# Patient Record
Sex: FEMALE | Race: WHITE | NOT HISPANIC OR LATINO | Employment: OTHER | URBAN - METROPOLITAN AREA
[De-identification: names, ages, dates, MRNs, and addresses within clinical notes are randomized per-mention and may not be internally consistent; named-entity substitution may affect disease eponyms.]

---

## 2021-01-01 ENCOUNTER — APPOINTMENT (OUTPATIENT)
Dept: GENERAL RADIOLOGY | Facility: CLINIC | Age: 86
End: 2021-01-01
Attending: EMERGENCY MEDICINE
Payer: COMMERCIAL

## 2021-01-01 ENCOUNTER — PATIENT OUTREACH (OUTPATIENT)
Dept: CARE COORDINATION | Facility: CLINIC | Age: 86
End: 2021-01-01

## 2021-01-01 ENCOUNTER — APPOINTMENT (OUTPATIENT)
Dept: CT IMAGING | Facility: CLINIC | Age: 86
End: 2021-01-01
Attending: HOSPITALIST
Payer: COMMERCIAL

## 2021-01-01 ENCOUNTER — APPOINTMENT (OUTPATIENT)
Dept: CT IMAGING | Facility: CLINIC | Age: 86
End: 2021-01-01
Attending: EMERGENCY MEDICINE
Payer: COMMERCIAL

## 2021-01-01 ENCOUNTER — HOSPITAL ENCOUNTER (OUTPATIENT)
Facility: CLINIC | Age: 86
Setting detail: OBSERVATION
Discharge: HOME | End: 2021-11-03
Attending: EMERGENCY MEDICINE | Admitting: INTERNAL MEDICINE
Payer: COMMERCIAL

## 2021-01-01 VITALS
OXYGEN SATURATION: 94 % | HEART RATE: 87 BPM | TEMPERATURE: 97.9 F | DIASTOLIC BLOOD PRESSURE: 76 MMHG | SYSTOLIC BLOOD PRESSURE: 149 MMHG | RESPIRATION RATE: 28 BRPM

## 2021-01-01 DIAGNOSIS — U07.1 COVID-19: ICD-10-CM

## 2021-01-01 DIAGNOSIS — Z71.89 OTHER SPECIFIED COUNSELING: ICD-10-CM

## 2021-01-01 DIAGNOSIS — F03.90 DEMENTIA WITHOUT BEHAVIORAL DISTURBANCE, UNSPECIFIED DEMENTIA TYPE: ICD-10-CM

## 2021-01-01 DIAGNOSIS — R41.82 ALTERED MENTAL STATUS, UNSPECIFIED ALTERED MENTAL STATUS TYPE: ICD-10-CM

## 2021-01-01 LAB
ANION GAP SERPL CALCULATED.3IONS-SCNC: 7 MMOL/L (ref 3–14)
BASOPHILS # BLD AUTO: 0 10E3/UL (ref 0–0.2)
BASOPHILS NFR BLD AUTO: 0 %
BUN SERPL-MCNC: 28 MG/DL (ref 7–30)
CALCIUM SERPL-MCNC: 8.3 MG/DL (ref 8.5–10.1)
CHLORIDE BLD-SCNC: 100 MMOL/L (ref 94–109)
CO2 SERPL-SCNC: 25 MMOL/L (ref 20–32)
CREAT SERPL-MCNC: 0.81 MG/DL (ref 0.52–1.04)
CRP SERPL-MCNC: 17.4 MG/L (ref 0–8)
D DIMER PPP FEU-MCNC: 1.85 UG/ML FEU (ref 0–0.5)
DEPRECATED S PYO AG THROAT QL EIA: NEGATIVE
EOSINOPHIL # BLD AUTO: 0 10E3/UL (ref 0–0.7)
EOSINOPHIL NFR BLD AUTO: 0 %
ERYTHROCYTE [DISTWIDTH] IN BLOOD BY AUTOMATED COUNT: 13.6 % (ref 10–15)
FLUAV RNA SPEC QL NAA+PROBE: NEGATIVE
FLUBV RNA RESP QL NAA+PROBE: NEGATIVE
GFR SERPL CREATININE-BSD FRML MDRD: 64 ML/MIN/1.73M2
GLUCOSE BLD-MCNC: 107 MG/DL (ref 70–99)
GROUP A STREP BY PCR: NOT DETECTED
HCT VFR BLD AUTO: 37.2 % (ref 35–47)
HGB BLD-MCNC: 12.4 G/DL (ref 11.7–15.7)
HOLD SPECIMEN: NORMAL
IMM GRANULOCYTES # BLD: 0 10E3/UL
IMM GRANULOCYTES NFR BLD: 0 %
LYMPHOCYTES # BLD AUTO: 1.3 10E3/UL (ref 0.8–5.3)
LYMPHOCYTES NFR BLD AUTO: 18 %
MAGNESIUM SERPL-MCNC: 2.2 MG/DL (ref 1.6–2.3)
MCH RBC QN AUTO: 33.2 PG (ref 26.5–33)
MCHC RBC AUTO-ENTMCNC: 33.3 G/DL (ref 31.5–36.5)
MCV RBC AUTO: 100 FL (ref 78–100)
MONOCYTES # BLD AUTO: 1 10E3/UL (ref 0–1.3)
MONOCYTES NFR BLD AUTO: 13 %
NEUTROPHILS # BLD AUTO: 4.9 10E3/UL (ref 1.6–8.3)
NEUTROPHILS NFR BLD AUTO: 69 %
NRBC # BLD AUTO: 0 10E3/UL
NRBC BLD AUTO-RTO: 0 /100
PLATELET # BLD AUTO: 218 10E3/UL (ref 150–450)
POTASSIUM BLD-SCNC: 3.9 MMOL/L (ref 3.4–5.3)
PROCALCITONIN SERPL-MCNC: <0.05 NG/ML
RBC # BLD AUTO: 3.74 10E6/UL (ref 3.8–5.2)
SARS-COV-2 RNA RESP QL NAA+PROBE: POSITIVE
SODIUM SERPL-SCNC: 132 MMOL/L (ref 133–144)
WBC # BLD AUTO: 7.2 10E3/UL (ref 4–11)

## 2021-01-01 PROCEDURE — 85025 COMPLETE CBC W/AUTO DIFF WBC: CPT | Performed by: EMERGENCY MEDICINE

## 2021-01-01 PROCEDURE — 71046 X-RAY EXAM CHEST 2 VIEWS: CPT

## 2021-01-01 PROCEDURE — 96374 THER/PROPH/DIAG INJ IV PUSH: CPT

## 2021-01-01 PROCEDURE — 87636 SARSCOV2 & INF A&B AMP PRB: CPT | Performed by: EMERGENCY MEDICINE

## 2021-01-01 PROCEDURE — C9803 HOPD COVID-19 SPEC COLLECT: HCPCS

## 2021-01-01 PROCEDURE — 83735 ASSAY OF MAGNESIUM: CPT | Performed by: EMERGENCY MEDICINE

## 2021-01-01 PROCEDURE — 84145 PROCALCITONIN (PCT): CPT | Performed by: EMERGENCY MEDICINE

## 2021-01-01 PROCEDURE — G0378 HOSPITAL OBSERVATION PER HR: HCPCS

## 2021-01-01 PROCEDURE — 99207 PR CDG-CODE CATEGORY CHANGED: CPT | Performed by: HOSPITALIST

## 2021-01-01 PROCEDURE — 85379 FIBRIN DEGRADATION QUANT: CPT | Performed by: EMERGENCY MEDICINE

## 2021-01-01 PROCEDURE — 250N000009 HC RX 250: Performed by: HOSPITALIST

## 2021-01-01 PROCEDURE — 99285 EMERGENCY DEPT VISIT HI MDM: CPT | Mod: 25

## 2021-01-01 PROCEDURE — 96372 THER/PROPH/DIAG INJ SC/IM: CPT | Performed by: EMERGENCY MEDICINE

## 2021-01-01 PROCEDURE — 96361 HYDRATE IV INFUSION ADD-ON: CPT

## 2021-01-01 PROCEDURE — 80048 BASIC METABOLIC PNL TOTAL CA: CPT | Performed by: EMERGENCY MEDICINE

## 2021-01-01 PROCEDURE — 258N000003 HC RX IP 258 OP 636: Performed by: EMERGENCY MEDICINE

## 2021-01-01 PROCEDURE — 86140 C-REACTIVE PROTEIN: CPT | Performed by: EMERGENCY MEDICINE

## 2021-01-01 PROCEDURE — 250N000013 HC RX MED GY IP 250 OP 250 PS 637: Performed by: INTERNAL MEDICINE

## 2021-01-01 PROCEDURE — 250N000013 HC RX MED GY IP 250 OP 250 PS 637: Performed by: HOSPITALIST

## 2021-01-01 PROCEDURE — 71275 CT ANGIOGRAPHY CHEST: CPT

## 2021-01-01 PROCEDURE — 36415 COLL VENOUS BLD VENIPUNCTURE: CPT | Performed by: EMERGENCY MEDICINE

## 2021-01-01 PROCEDURE — 99207 PR APP CREDIT; MD BILLING SHARED VISIT: CPT | Performed by: INTERNAL MEDICINE

## 2021-01-01 PROCEDURE — 99236 HOSP IP/OBS SAME DATE HI 85: CPT | Performed by: HOSPITALIST

## 2021-01-01 PROCEDURE — 250N000011 HC RX IP 250 OP 636: Performed by: HOSPITALIST

## 2021-01-01 PROCEDURE — 250N000011 HC RX IP 250 OP 636: Performed by: EMERGENCY MEDICINE

## 2021-01-01 PROCEDURE — 70450 CT HEAD/BRAIN W/O DYE: CPT

## 2021-01-01 PROCEDURE — 87651 STREP A DNA AMP PROBE: CPT | Performed by: EMERGENCY MEDICINE

## 2021-01-01 RX ORDER — CARBOXYMETHYLCELLULOSE SODIUM 5 MG/ML
1 SOLUTION/ DROPS OPHTHALMIC 2 TIMES DAILY
Qty: 30 EACH | Refills: 0 | Status: SHIPPED | OUTPATIENT
Start: 2021-01-01 | End: 2021-01-01

## 2021-01-01 RX ORDER — CARBOXYMETHYLCELLULOSE SODIUM 5 MG/ML
1 SOLUTION/ DROPS OPHTHALMIC 2 TIMES DAILY
Qty: 30 EACH | Refills: 0 | Status: SHIPPED | OUTPATIENT
Start: 2021-01-01

## 2021-01-01 RX ORDER — GLIPIZIDE 10 MG/1
1 TABLET ORAL 2 TIMES DAILY
Status: DISCONTINUED | OUTPATIENT
Start: 2021-01-01 | End: 2021-01-01 | Stop reason: HOSPADM

## 2021-01-01 RX ORDER — IOPAMIDOL 755 MG/ML
500 INJECTION, SOLUTION INTRAVASCULAR ONCE
Status: DISCONTINUED | OUTPATIENT
Start: 2021-01-01 | End: 2021-01-01

## 2021-01-01 RX ORDER — QUETIAPINE FUMARATE 25 MG/1
12.5 TABLET, FILM COATED ORAL 3 TIMES DAILY
Qty: 45 TABLET | Refills: 0 | Status: SHIPPED | OUTPATIENT
Start: 2021-01-01 | End: 2021-01-01

## 2021-01-01 RX ORDER — ACETAMINOPHEN 650 MG/1
650 SUPPOSITORY RECTAL EVERY 6 HOURS PRN
Status: DISCONTINUED | OUTPATIENT
Start: 2021-01-01 | End: 2021-01-01 | Stop reason: HOSPADM

## 2021-01-01 RX ORDER — AMOXICILLIN 250 MG
2 CAPSULE ORAL 2 TIMES DAILY PRN
Status: DISCONTINUED | OUTPATIENT
Start: 2021-01-01 | End: 2021-01-01 | Stop reason: HOSPADM

## 2021-01-01 RX ORDER — QUETIAPINE FUMARATE 25 MG/1
12.5 TABLET, FILM COATED ORAL 3 TIMES DAILY
Qty: 45 TABLET | Refills: 0 | Status: SHIPPED | OUTPATIENT
Start: 2021-01-01

## 2021-01-01 RX ORDER — ONDANSETRON 2 MG/ML
4 INJECTION INTRAMUSCULAR; INTRAVENOUS EVERY 6 HOURS PRN
Status: DISCONTINUED | OUTPATIENT
Start: 2021-01-01 | End: 2021-01-01 | Stop reason: HOSPADM

## 2021-01-01 RX ORDER — CARBOXYMETHYLCELLULOSE SODIUM 5 MG/ML
1 SOLUTION/ DROPS OPHTHALMIC 2 TIMES DAILY
COMMUNITY
End: 2021-01-01

## 2021-01-01 RX ORDER — FUROSEMIDE 20 MG
10 TABLET ORAL EVERY MORNING
COMMUNITY
End: 2021-01-01

## 2021-01-01 RX ORDER — ACETAMINOPHEN 325 MG/1
325 TABLET ORAL 2 TIMES DAILY
Qty: 60 TABLET | Refills: 0 | Status: SHIPPED | OUTPATIENT
Start: 2021-01-01 | End: 2021-01-01

## 2021-01-01 RX ORDER — HALOPERIDOL 5 MG/ML
5 INJECTION INTRAMUSCULAR ONCE
Status: COMPLETED | OUTPATIENT
Start: 2021-01-01 | End: 2021-01-01

## 2021-01-01 RX ORDER — OMEPRAZOLE 10 MG/1
20 CAPSULE, DELAYED RELEASE ORAL
Qty: 60 CAPSULE | Refills: 0 | Status: SHIPPED | OUTPATIENT
Start: 2021-01-01 | End: 2021-01-01

## 2021-01-01 RX ORDER — ACETAMINOPHEN 325 MG/1
325 TABLET ORAL 2 TIMES DAILY
COMMUNITY
End: 2021-01-01

## 2021-01-01 RX ORDER — BISACODYL 10 MG
10 SUPPOSITORY, RECTAL RECTAL DAILY PRN
Status: DISCONTINUED | OUTPATIENT
Start: 2021-01-01 | End: 2021-01-01 | Stop reason: HOSPADM

## 2021-01-01 RX ORDER — POLYETHYLENE GLYCOL 3350 17 G/17G
17 POWDER, FOR SOLUTION ORAL DAILY PRN
Status: DISCONTINUED | OUTPATIENT
Start: 2021-01-01 | End: 2021-01-01 | Stop reason: HOSPADM

## 2021-01-01 RX ORDER — ONDANSETRON 4 MG/1
4 TABLET, ORALLY DISINTEGRATING ORAL EVERY 6 HOURS PRN
Status: DISCONTINUED | OUTPATIENT
Start: 2021-01-01 | End: 2021-01-01 | Stop reason: HOSPADM

## 2021-01-01 RX ORDER — QUETIAPINE FUMARATE 25 MG/1
12.5 TABLET, FILM COATED ORAL 3 TIMES DAILY
COMMUNITY
End: 2021-01-01

## 2021-01-01 RX ORDER — MIRTAZAPINE 7.5 MG/1
7.5 TABLET, FILM COATED ORAL AT BEDTIME
COMMUNITY
End: 2021-01-01

## 2021-01-01 RX ORDER — OMEPRAZOLE 10 MG/1
20 CAPSULE, DELAYED RELEASE ORAL
Qty: 60 CAPSULE | Refills: 0 | Status: SHIPPED | OUTPATIENT
Start: 2021-01-01

## 2021-01-01 RX ORDER — ACETAMINOPHEN 325 MG/1
650 TABLET ORAL EVERY 6 HOURS PRN
Status: DISCONTINUED | OUTPATIENT
Start: 2021-01-01 | End: 2021-01-01 | Stop reason: HOSPADM

## 2021-01-01 RX ORDER — MIRTAZAPINE 7.5 MG/1
7.5 TABLET, FILM COATED ORAL AT BEDTIME
Qty: 30 TABLET | Refills: 0 | Status: SHIPPED | OUTPATIENT
Start: 2021-01-01 | End: 2021-01-01

## 2021-01-01 RX ORDER — AMOXICILLIN 250 MG
1 CAPSULE ORAL 2 TIMES DAILY PRN
Status: DISCONTINUED | OUTPATIENT
Start: 2021-01-01 | End: 2021-01-01 | Stop reason: HOSPADM

## 2021-01-01 RX ORDER — MIRTAZAPINE 7.5 MG/1
7.5 TABLET, FILM COATED ORAL AT BEDTIME
Qty: 30 TABLET | Refills: 0 | Status: SHIPPED | OUTPATIENT
Start: 2021-01-01

## 2021-01-01 RX ORDER — OMEPRAZOLE 10 MG/1
20 CAPSULE, DELAYED RELEASE ORAL
COMMUNITY
End: 2021-01-01

## 2021-01-01 RX ORDER — QUETIAPINE FUMARATE 25 MG/1
12.5 TABLET, FILM COATED ORAL
COMMUNITY
End: 2021-01-01

## 2021-01-01 RX ORDER — MIRTAZAPINE 7.5 MG/1
7.5 TABLET, FILM COATED ORAL AT BEDTIME
Status: DISCONTINUED | OUTPATIENT
Start: 2021-01-01 | End: 2021-01-01 | Stop reason: HOSPADM

## 2021-01-01 RX ORDER — ACETAMINOPHEN 325 MG/1
325 TABLET ORAL EVERY 8 HOURS PRN
COMMUNITY
End: 2021-01-01

## 2021-01-01 RX ORDER — FUROSEMIDE 20 MG
10 TABLET ORAL EVERY MORNING
Qty: 15 TABLET | Refills: 0 | Status: SHIPPED | OUTPATIENT
Start: 2021-01-01 | End: 2021-01-01

## 2021-01-01 RX ORDER — IOPAMIDOL 755 MG/ML
500 INJECTION, SOLUTION INTRAVASCULAR ONCE
Status: COMPLETED | OUTPATIENT
Start: 2021-01-01 | End: 2021-01-01

## 2021-01-01 RX ORDER — QUETIAPINE FUMARATE 25 MG/1
12.5 TABLET, FILM COATED ORAL
Qty: 15 TABLET | Refills: 0 | Status: SHIPPED | OUTPATIENT
Start: 2021-01-01 | End: 2021-01-01

## 2021-01-01 RX ORDER — ACETAMINOPHEN 325 MG/1
325 TABLET ORAL EVERY 8 HOURS PRN
Qty: 30 TABLET | Refills: 0 | Status: SHIPPED | OUTPATIENT
Start: 2021-01-01

## 2021-01-01 RX ORDER — ACETAMINOPHEN 325 MG/1
325 TABLET ORAL EVERY 8 HOURS PRN
Qty: 30 TABLET | Refills: 0 | Status: SHIPPED | OUTPATIENT
Start: 2021-01-01 | End: 2021-01-01

## 2021-01-01 RX ORDER — FUROSEMIDE 20 MG
10 TABLET ORAL EVERY MORNING
Qty: 15 TABLET | Refills: 0 | Status: SHIPPED | OUTPATIENT
Start: 2021-01-01

## 2021-01-01 RX ORDER — OLANZAPINE 10 MG/2ML
10 INJECTION, POWDER, FOR SOLUTION INTRAMUSCULAR ONCE
Status: COMPLETED | OUTPATIENT
Start: 2021-01-01 | End: 2021-01-01

## 2021-01-01 RX ORDER — QUETIAPINE FUMARATE 25 MG/1
12.5 TABLET, FILM COATED ORAL
Qty: 15 TABLET | Refills: 0 | DISCHARGE
Start: 2021-01-01

## 2021-01-01 RX ADMIN — SODIUM CHLORIDE 82 ML: 9 INJECTION, SOLUTION INTRAVENOUS at 11:50

## 2021-01-01 RX ADMIN — IOPAMIDOL 59 ML: 755 INJECTION, SOLUTION INTRAVENOUS at 11:49

## 2021-01-01 RX ADMIN — OLANZAPINE 10 MG: 10 INJECTION, POWDER, FOR SOLUTION INTRAMUSCULAR at 02:32

## 2021-01-01 RX ADMIN — QUETIAPINE FUMARATE 12.5 MG: 25 TABLET ORAL at 15:52

## 2021-01-01 RX ADMIN — HALOPERIDOL LACTATE 5 MG: 5 INJECTION, SOLUTION INTRAMUSCULAR at 01:47

## 2021-01-01 RX ADMIN — SODIUM CHLORIDE 1000 ML: 9 INJECTION, SOLUTION INTRAVENOUS at 02:31

## 2021-01-01 RX ADMIN — ACETAMINOPHEN 650 MG: 325 TABLET, FILM COATED ORAL at 05:11

## 2021-01-01 ASSESSMENT — ACTIVITIES OF DAILY LIVING (ADL)
DEPENDENT_IADLS:: CLEANING;COOKING;LAUNDRY;SHOPPING;MEAL PREPARATION;MEDICATION MANAGEMENT;MONEY MANAGEMENT;TRANSPORTATION

## 2021-11-03 PROBLEM — U07.1 COVID-19: Status: ACTIVE | Noted: 2021-01-01

## 2021-11-03 PROBLEM — R41.82 ALTERED MENTAL STATUS, UNSPECIFIED ALTERED MENTAL STATUS TYPE: Status: ACTIVE | Noted: 2021-01-01

## 2021-11-03 NOTE — ED NOTES
Daughter brought her in from assisted living has a deep cough , some yellow. Pt has double hearing aids. [facility] checked for uti neg at 2000. Hs of Alzheimer

## 2021-11-03 NOTE — PROGRESS NOTES
" called Joceline the nurse at the patient's home noting that the patient is ready for discharge. Joceline noted that the patient cannot come back to her assisted living facility tonight and needs to get the rest of the people tested for COVID-19. The  noted that she initially thought the patient would be here 1-2 days and noted the patient is actually ready for discharge. Joceline said \"you cannot keep her until 8 am tomorrow?\" The  noted that the patient is ready for discharge. The  noted that it is the law in [place] that assisted living facilities have to take their client's back. Joceline hung up on the .  called Joceline back. Joceline said she is putting in her notice to quit. She was going to call the owner of the home, Pastor Mauricio. She then hung up on the  again.      called the owner, Pastor Mauricio. The  explained that the patient is ready for discharge. Hang expressed concern that there are other 90 year olds and a 105 year old in the home and the patient will spread COVID to the other residents and they will die. The  asked what precautions they have taken thus far to keep residents safe. Hang explained that the patient is not going to be able to stay in her own room unless the hospital gives her drugs to keep her in there. Hang noted that the hospital has to keep the patient a few more days. The  explained that the patient does not need medical admission at this time and is ready to discharge. The  noted that we would not kick the patient to the street but she is ready for discharge. Hang was going to call Joceline and then call the nursing station back.       CAMPBELL Tirado, [facility]  , ED Care Management   [phone]      "

## 2021-11-03 NOTE — CONSULTS
See ED note for SW consult.     CAMPBELL Tirado, [facility]  , ED Care Management   [phone]

## 2021-11-03 NOTE — ED NOTES
Care Management Initial Consult    General Information  Assessment completed with: Children, Patient's daughter Carlene   Type of CM/SW Visit: Initial Assessment    Primary Care Provider verified and updated as needed:     Readmission within the last 30 days: no previous admission in last 30 days      Reason for Consult: discharge planning  Advance Care Planning:            Communication Assessment  Patient's communication style: spoken language (English or Bilingual)    Hearing Difficulty or Deaf: yes   Wear Glasses or Blind: yes    Cognitive  Cognitive/Neuro/Behavioral:                        Living Environment:   People in home: facility resident     Current living Arrangements: assisted living  Name of Facility: [facility] in [place]   Able to return to prior arrangements: yes       Family/Social Support:  Care provided by: self, other (see comments) (Facility staff)  Provides care for: no one, unable/limited ability to care for self     Children          Description of Support System: Supportive, Involved    Support Assessment: Adequate family and caregiver support    Current Resources:   Patient receiving home care services: No     Community Resources:    Equipment currently used at home: walker, standard  Supplies currently used at home:      Employment/Financial:  Employment Status:          Financial Concerns: No concerns identified   Referral to Financial Counselor: No  Finance Comments: Elvira Concepcion is POA    Lifestyle & Psychosocial Needs:  Social Determinants of Health     Tobacco Use: Unknown     Smoking Tobacco Use: Never Smoker     Smokeless Tobacco Use: Unknown   Alcohol Use:      Frequency of Alcohol Consumption:      Average Number of Drinks:      Frequency of Binge Drinking:    Financial Resource Strain:      Difficulty of Paying Living Expenses:    Food Insecurity:      Worried About Running Out of Food in the Last Year:      Ran Out of Food in the Last Year:    Transportation Needs:      Lack of  Transportation (Medical):      Lack of Transportation (Non-Medical):    Physical Activity:      Days of Exercise per Week:      Minutes of Exercise per Session:    Stress:      Feeling of Stress :    Social Connections:      Frequency of Communication with Friends and Family:      Frequency of Social Gatherings with Friends and Family:      Attends Scientology Services:      Active Member of Clubs or Organizations:      Attends Club or Organization Meetings:      Marital Status:    Intimate Partner Violence:      Fear of Current or Ex-Partner:      Emotionally Abused:      Physically Abused:      Sexually Abused:    Depression:      PHQ-2 Score:    Housing Stability:      Unable to Pay for Housing in the Last Year:      Number of Places Lived in the Last Year:      Unstable Housing in the Last Year:        Functional Status:  Prior to admission patient needed assistance:   Dependent ADLs:: Ambulation-walker, Bathing  Dependent IADLs:: Cleaning, Cooking, Laundry, Shopping, Meal Preparation, Medication Management, Money Management, Transportation  Assesssment of Functional Status: Not at baseline with mobility, Has complex medical needs, requires placement in a facility, Needs assistance with handling finances, Needs assistance with laundry/houskeeping, Needs assistance with meals, Needs assistance with bathing, Needs assistance with medications, Needs assistance with shopping, Needs assistance with transportation, Not at baseline with ADL Functioning    Mental Health Status:          Chemical Dependency Status:                Values/Beliefs:  Spiritual, Cultural Beliefs, Scientology Practices, Values that affect care:                 Additional Information:  Patient presents to the ED from [facility] which is an assisted living memory care unit in [place]. The address on file is the patient's daughter's address which the patient's daughter would like to keep as the one on file. The RN at [facility] is Eunice Hay  ([phone]). The owner is Pastor Hang Iyer ([phone]). The main line at the home is [phone]. Patient's daughter gave permission for SW to call the home to coordinate care.      called the main number to the home and was directed to call Joceline, a nurse. Her number is [phone]. Joceline reports the patient  has behaviors specific to bathrooms. Patient is on a strict bathroom schedule of every 2 hours. 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 Joceline's email is  [email or web address] . Joceline reports that the patient can discharge back to her home. They would just need a med list.       The assessment was completed via phone with the patient's daughter due to COVID   + result and patient having dementia. The patient's daughter Carlene is the POA. Carlene reports the patient has an advanced directive at her facility.  encouraged her to email it to [facility] [employer].     Patient gets assistance with medication administration, bathing, and meals. Patient uses a walker at baseline. Patient's family is being tested for COVID. They will discuss transportation.       CAMPBELL Tirado, [facility]  , ED Care Management   [phone]

## 2021-11-03 NOTE — DISCHARGE SUMMARY
[facility]  Hospitalist Discharge Summary      Date of Admission:  11/2/2021  Date of Discharge:  11/3/2021  Discharging Provider: Sohail Esteban DO      Discharge Diagnoses   1.  Acute encephalopathy.  2.  Advanced Alzheimer's dementia.  3.  COVID-19 infection.  4.  Mild hyponatremia.    Follow-ups Needed After Discharge   Follow-up Appointments     Follow Up and recommended labs and tests      Follow up with primary care provider in 7 days.  The following labs/tests   are recommended: Basic metabolic panel in 7 days.             Discharge Disposition   Discharged to long-term care facility  Condition at discharge: Stable      Hospital Course   Desiree Andino is a 89 year old female with a history of Alzheimer's dementia, anxiety, insomnia, GERD, peripheral vascular disease, breast cancer hyperlipidemia who presents with worsening confusion and agitation.  Laboratory testing did show very mild hyponatremia with a sodium of 132.  She is also found to have a positive SARS-CoV-2 PCR.  She was not hypoxic.  Did not need supplemental oxygen.  Her very mild acute encephalopathy on her already quite advanced dementia is likely due to her COVID-19 infection.  She is not requiring specific medications or supplemental oxygen.  She is previously vaccinated.  She could be considered for monoclonal antibody therapy in the outpatient setting by her primary care provider if there is concern that she would worsen.  She should follow-up with her primary care provider within the next 7 days.  Needs to have a basic metabolic panel done at that time.  She is being discharged back to her memory care unit at this time.  Mental status does seem improved and likely back to baseline.  No changes to medications were made during this hospital stay.    Consultations This Hospital Stay   CARE MANAGEMENT / SOCIAL WORK IP CONSULT  SOCIAL WORK IP CONSULT    Code Status   No CPR- Do NOT Intubate    Time Spent on  this Encounter   I spent 20 minutes with Ms. Andino and working on discharge on 11/3/2021.       Sohail Esteban [facility] EMERGENCY DEPT  [address] [facility] [address]  Phone: [phone]  Fax: [phone]  ______________________________________________________________________    Physical Exam   Vital Signs: Temp: 97.9  F (36.6  C) Temp src: Oral BP: (!) 149/76 Pulse: 87   Resp: 28 SpO2: 98 % O2 Device: None (Room air)    Weight: 0 lbs 0 oz  Gen:  NAD, A&Ox1 to self.  Not oriented to place or time.           Primary Care Physician   Christa Vaca    Discharge Orders      General info for SNF    Length of Stay Estimate: Long Term Care  Condition at Discharge: Stable  Level of care:skilled   Rehabilitation Potential: Poor  Admission H&P remains valid and up-to-date: Yes  Recent Chemotherapy: N/A  Use Nursing Home Standing Orders: Yes     Mantoux instructions    Give two-step Mantoux (PPD) Per Facility Policy Yes     Follow Up and recommended labs and tests    Follow up with primary care provider in 7 days.  The following labs/tests are recommended: Basic metabolic panel in 7 days.     Reason for your hospital stay    Increased confusion on chronic dementia     Activity - Up with nursing assistance     Diet    Follow this diet upon discharge: Regular           Discharge Medications   Current Discharge Medication List      CONTINUE these medications which have NOT CHANGED    Details   !! acetaminophen (TYLENOL) 325 MG tablet Take 325 mg by mouth every 8 hours as needed for mild pain      !! acetaminophen (TYLENOL) 325 MG tablet Take 325 mg by mouth 2 times daily 0800/2000      carboxymethylcellulose PF (REFRESH PLUS) 0.5 % ophthalmic solution Place 1 drop into both eyes 2 times daily      furosemide (LASIX) 20 MG tablet Take 10 mg by mouth every morning      mirtazapine (REMERON) 7.5 MG tablet Take 7.5 mg by mouth At Bedtime      omeprazole (PRILOSEC) 10 MG DR capsule Take 20  mg by mouth every morning (before breakfast)      phenol-menthol (CEPASTAT) 14.5 MG lozenge Place 1 lozenge inside cheek every 2 hours as needed for sore throat      !! QUEtiapine (SEROQUEL) 25 MG tablet Take 12.5 mg by mouth 3 times daily 0800/1600/2000      !! QUEtiapine (SEROQUEL) 25 MG tablet Take 12.5 mg by mouth nightly as needed       !! - Potential duplicate medications found. Please discuss with provider.        Allergies   Allergies   Allergen Reactions     Nitrofurantoin Unknown

## 2021-11-03 NOTE — ED PROVIDER NOTES
History   Chief Complaint:  Altered Mental Status     HPI The history is limited due to the patient's dementia.      Desiree Andino is a 89 year old female with history of dementia who presents accompanied by her daughter for evaluation of an altered mental status. Recently the patient has developed a somewhat productive cough with yellow sputum, a sore throat, congestion, and some rhinorrhea. Yesterday afternoon staff at the patient's memory care facility noticed that she appeared more confused than usual. She was seen in an urgent care earlier today regarding this at which time she reportedly had an unremarkable urinalysis and she was sent into the ED for further evaluation and management. She denies any chest pain, difficulty breathing, or abdominal pain. She is vaccinated for COVID-19.     Urinalysis: RBC 6-10, WBC 3-5, Few bacteria, Few epithelial, Mucus present, Hyaline casts 0-2, o/w WNL     Review of Systems   Unable to perform ROS: Dementia     Allergies:  Nitrofurantoin     Medications:  The patient is not currently taking any prescribed medications.     Past Medical History:     Dementia   Breast cancer   Atrial fibrillation  Cancer  Nonsenile cataract       Past Surgical History:    Appendectomy  Biopsy  Cholecystectomy  Eye surgery  Gyn surgery  Hernia repair      Social History:  The patient presents to the ED accompanied by her daughter.   The patient lives in a memory care facility.   The patient is vaccinated for COVID-19.     Physical Exam     Patient Vitals for the past 24 hrs:   BP Temp Temp src Pulse Resp SpO2   11/03/21 0145 121/71 -- -- 83 -- 98 %   11/03/21 0130 134/83 -- -- 81 -- 98 %   11/02/21 2113 106/64 98.6  F (37  C) Oral 92 16 98 %       Physical Exam  General: Patient is awake, alert and interactive when I enter the room  Head: The scalp, face, and head appear normal  Eyes: The pupils are equal, round, and reactive to light. Conjunctivae and sclerae are normal  ENT: External  acoustic canals are normal. The oropharynx is normal without erythema. Uvula is in the midline  Neck: Normal range of motion.   CV: Regular rate and rhythm.   Resp: Lungs are clear without wheezes or rales. No respiratory distress.   GI: Abdomen is soft, no rigidity, guarding, or rebound. No distension. No tenderness to palpation in any quadrant.     MS: Normal tone. Joints grossly normal without effusions. No asymmetric leg swelling, calf or thigh tenderness.    Skin: No rash or lesions noted. Normal capillary refill noted  Neuro: dementia at baseline. Oriented to self. Speech is normal and fluent. Face is symmetric. Moving all extremities.   Psych:  Normal affect.  Appropriate interactions.    Emergency Department Course     Imaging:  XR Chest:  IMPRESSION: Mildly enlarged cardiac silhouette. Calcified aortic arch. Patchy areas of airspace opacification in the right lateral lung base could reflect an infectious infiltrate. No visible pneumothorax or pleural effusion. Right axillary surgical   clips. Osteopenia.  Per radiology.     CT Head w/o Contrast:  IMPRESSION:   1.  No acute intracranial abnormality.   2.  Mild to moderate age-related changes.  Per radiology.     Laboratory:  CBC: WBC 7.2, HGB 12.4,    BMP:  low, Calcium 8.3 low, Glucose 107 high, o/w WNL (Creatinine 0.81)   Magnesium: 2.2  D dimer quantitative: 1.85 high   CRP inflammation: 17.4 high   Symptomatic Influenza A/B & SARS-CoV2 (COVID19) Virus PCR Multiplex: COVID positive, Influenza A/B negative   Streptococcus A rapid screen with reflex to PCR: Negative  Group A Streptococcus PCR throat swab: Pending        Emergency Department Course:  Reviewed:  I reviewed nursing notes, vitals and past medical history    Assessments:  9613: I obtained history and examined the patient as noted above.     0129: I updated and reassessed the patient.     Consults:  0208: I spoke with Dr. Cannon of the hospitalist service regarding patient's  presentation, findings, and plan of care.     Interventions:  0147 Haldol 5 mg IV   0231 NS 1,000 mL IV   0232 Zyprexa 10 mg IM   0511 Tylenol 650 mg PO    Disposition:  The patient was admitted to the hospital under the care of Dr. Cannon.     Impression & Plan     Medical Decision Making:  Desiree Andino is a 89 year old female with history of dementia who presents accompanied by her daughter for evaluation of an altered mental status.  Daughter reports that the patient has had symptoms of productive cough, sore throat and congestion with increased confusion.  She is brought in from her memory care facility due to increased agitation.  On initial evaluation she is hemodynamically stable with normal vital signs.  She is afebrile.  She is oxygenating well on room air.  On initial physical exam she does not appear in any respiratory distress and is redirectable and alert.  However daughter reports that she is below her normal mental baseline.  Investigation was initiated to investigate the patient's altered mental status and respiratory complaints.  Patient was ultimately found to have COVID-19 infection likely leading to both her altered mental status and respiratory symptoms.  Patient remained stable on room air oxygen and would not require admission from a respiratory standpoint but due to her increasing confusion and agitation and I believe she is unsafe to return to her assisted care facility.  Therefore she will be admitted for further evaluation and treatment.  Discussed this plan with the patient's daughter and she is amenable at this time.  Haldol and Zyprexa were administered to facilitate our cares and for anxiolysis.     Covid-19  Desiree Andino was evaluated during a global COVID-19 pandemic, which necessitated consideration that the patient might be at risk for infection with the SARS-CoV-2 virus that causes COVID-19.   Applicable protocols for evaluation were followed during the patient's  care.   COVID-19 was considered as part of the patient's evaluation. The plan for testing is:  a test was obtained during this visit.     Diagnosis:    ICD-10-CM    1. Altered mental status, unspecified altered mental status type  R41.82    2. COVID-19  U07.1    3. Dementia without behavioral disturbance, unspecified dementia type (H)  F03.90         Scribe Disclosure:  I, Sudarshan Carrillo, am serving as a scribe at 11:55 PM on 11/2/2021 to document services personally performed by Humberto Miranda MD, based on my observations and the provider's statements to me.      Humberto Miranda MD  11/03/21 0607

## 2021-11-03 NOTE — ED NOTES
Updates pt's daughter on condition and plan for care.  Discussed CT performed and reorientation of dementia sx.  Daughter, Carlene, requests update if condition changes or when pt moves to new room.

## 2021-11-03 NOTE — ED NOTES
[facility]  ED Nurse Handoff Report    Desiree Andino is a 89 year old female   ED Chief complaint: Altered Mental Status  . ED Diagnosis:   Final diagnoses:   None     Allergies:   Allergies   Allergen Reactions     Nitrofurantoin Unknown       Code Status: {dnr  Activity level - Baseline/Home:  Assist X 1. Activity Level - Current:   Assist X 1. Lift room needed: No. Bariatric: No   Needed: No   Isolation: Yes. Infection: covid    Vital Signs:   Vitals:    11/02/21 2113 11/03/21 0130   BP: 106/64 134/83   Pulse: 92 81   Resp: 16    Temp: 98.6  F (37  C)    TempSrc: Oral    SpO2: 98% 98%       Cardiac Rhythm:  ,      Pain level:    Patient confused: Yes. Patient Falls Risk: Yes.   Elimination Status: Has voided   Patient Report - Initial Complaint: daughter brought her in from assisted living with a cough. Focused Assessment: occ cough. Pt very confused  Tests Performed:swab , labs. Abnormal Results: positive for covidTreatments provided: admit  Family Comments:daughter hereOBS brochure/video discussed/provided to patient: no  ED Medications: Medications - No data to display  Drips infusing:  No  For the majority of the shift, the patient's behavior Green. Interventions performed were .    Sepsis treatment initiated: no    Patient tested for COVID 19 prior to admission: positive    ED Nurse Name/Phone Number: Kristen Stevens RN,   1:35 AM  222

## 2021-11-03 NOTE — ED NOTES
Spoke to nurse at ROGELIO's home Joceline who stated a  had spoke to her and assured her it would be 1-2 days before he cam back and she was not able to taker her back tonight because none of the other residents have been tested and will not be until tomorrow.   had written a note that the home would take them back and keep her in her private room.  Turned over to .

## 2021-11-03 NOTE — ED NOTES
Ordered meal for pt and assisted pt with eating. Pt ate most of her meal tray. Writer assisted pt to the commode after she finished eating. Writer assisted by RN as pt is a two person assist. Pt currently resting in bed.

## 2021-11-03 NOTE — ED NOTES
Care Management Discharge Note    Discharge Date: 11/03/2021       Discharge Disposition:  Discharge back to assisted living     Discharge Services:  none    Discharge DME:  None     Discharge Transportation:  Family or TBD    Private pay costs discussed: Not applicable      Additional Information:  Patient is discharging back to her facility. Facility is able to take patient back in her own private room.     CAMPBELL Tirado, [facility]  , ED Care Management   [phone]

## 2021-11-03 NOTE — ED NOTES
RN attempted to start another IV for CT PE. Patient became combative and hitting RN. MD Miranda notified. IV not placed.

## 2021-11-03 NOTE — ED TRIAGE NOTES
Per memory care staff pt has been more confused since yesterday afternoon.  staff say she has had a cough and nasal congestion.  No fever.  Pt had a urine done at urgent care which was negative.  Here for further work up.

## 2021-11-03 NOTE — PHARMACY-ADMISSION MEDICATION HISTORY
Admission medication history interview status for this patient is complete. See [facility] admission navigator for allergy information, prior to admission medications and immunization status.     Medication history interview done, indicate source(s): SANGEETHA Car from [facility] ([phone])  Medication history resources (including written lists, pill bottles, clinic record):None  [facility] [address]    Changes made to PTA medication list:  Added:  ALL meds  Changed: -  Reported as Not Taking: -  Removed: -    Actions taken by pharmacist (provider contacted, etc):None   Additional medication history information:None  Medication reconciliation/reorder completed by provider prior to medication history? no     Prior to Admission medications    Medication Sig Last Dose Taking? Auth Provider   acetaminophen (TYLENOL) 325 MG tablet Take 325 mg by mouth every 8 hours as needed for mild pain More than a month at Unknown time Yes Unknown, Entered By History   acetaminophen (TYLENOL) 325 MG tablet Take 325 mg by mouth 2 times daily 0800/2000 11/2/2021 at x 1 Yes Unknown, Entered By History   carboxymethylcellulose PF (REFRESH PLUS) 0.5 % ophthalmic solution Place 1 drop into both eyes 2 times daily 11/2/2021 at x 1 Yes Unknown, Entered By History   furosemide (LASIX) 20 MG tablet Take 10 mg by mouth every morning 11/2/2021 at am Yes Unknown, Entered By History   mirtazapine (REMERON) 7.5 MG tablet Take 7.5 mg by mouth At Bedtime 11/1/2021 at hs Yes Unknown, Entered By History   omeprazole (PRILOSEC) 10 MG DR capsule Take 20 mg by mouth every morning (before breakfast) 11/2/2021 at am Yes Unknown, Entered By History   phenol-menthol (CEPASTAT) 14.5 MG lozenge Place 1 lozenge inside cheek every 2 hours as needed for sore throat Past Month at Unknown time Yes Unknown, Entered By History   QUEtiapine (SEROQUEL) 25 MG tablet Take 12.5 mg by mouth 3 times daily 0800/1600/2000 11/2/2021  at x 1 Yes Unknown, Entered By History   QUEtiapine (SEROQUEL) 25 MG tablet Take 12.5 mg by mouth nightly as needed Past Month at Unknown time Yes Unknown, Entered By History

## 2021-11-03 NOTE — H&P
[facility]  Hospitalist H&P    Name: Desiree Andino      MRN: [id number]  YOB: 1930    Age: 89 year old  Date of admission: 11/2/2021  Primary care provider: Christa Vaca            Assessment and Plan:     Desiree Andino is a 89 year old female with a history of Alzheimer's dementia, anxiety, insomnia, GERD, peripheral vascular disease, breast cancer hyperlipidemia who presents with worsening confusion and agitation.    The patient lives at a memory care unit.  Over the past few days she has developed a productive cough with yellow sputum and a sore throat with congestion and rhinorrhea.  Yesterday afternoon the patient's staff at her facility noticed that she was more confused and agitated than her typical condition.  She was brought to urgent care for evaluation and urinalysis was performed that was unremarkable and as a result she was directed to the ED for evaluation.    Here her temperature is 98.6, heart rate 92, blood pressure 106/64, respiratory 16 and oxygen saturation 98% on room air.  Labs show sodium of 132 but otherwise normal basic metabolic panel.  Magnesium is 2.2 and CRP of 17.4.  CBC normal.  Rapid strep screen is negative and culture pending.  Influenza is negative but COVID-19 PCR is positive.  Head CT shows no abnormalities.  Chest x-ray shows patchy area of opacification in the right lateral lung which could reflect infectious infiltrate.  D-dimer is elevated at 1.8 and CT PE protocol is pending.  She was given Haldol and Zyprexa and will be admitted for observation.    1. Acute encephalopathy: Likely due to COVID-19 infection.  Currently calm and resting.  Received Haldol and Zyprexa in the ED.  She has been seeing her PCP recently for worsening OCD and anxiety.  I believe chronically she is on Remeron with as needed Seroquel.  We will continue antipsychotics as needed for agitation.  2. Advanced [place]'s dementia: Resume prior to admission  medications when reconciled.  At baseline appears living in memory care unit.  3. COVID-19 infection: She is fully vaccinated.  Currently not hypoxic nor febrile.  No indications for steroids or remdesivir.  I anticipate only a brief hospitalization so we will hold off on anticoagulation for the time being.  Maintain special isolation precautions.  4. Elevated D-dimer: CT PE protocol ordered by Dr. Miranda.  We will follow up results.  Chest x-ray does show some degree of infiltrates.  We will check a procalcitonin level but not initiate antibiotics.    Code status: DNR/DNI per chart review.  Admit to observation status.  Prophylaxis: PCD's for now, if not discharging later in the day today or tomorrow I would initiate enoxaparin.  Disposition: Likely back to memory care unit in 1 to 2 days.            Chief Complaint:     Worsening confusion.         History of Present Illness:   Desiree Andino is a 89 year old female who presents with worsening confusion.  History was obtained from my discussion with the ED provider.  The electronic medical record was also reviewed.    The patient lives at a memory care unit.  Over the past few days she has developed a productive cough with yellow sputum and a sore throat with congestion and rhinorrhea.  Yesterday afternoon the patient's staff at her facility noticed that she was more confused and agitated than her typical condition.  She was brought to urgent care for evaluation and urinalysis was performed that was unremarkable and as a result she was directed to the ED for evaluation.    Here her temperature is 98.6, heart rate 92, blood pressure 106/64, respiratory 16 and oxygen saturation 98% on room air.  Labs show sodium of 132 but otherwise normal basic metabolic panel.  Magnesium is 2.2 and CRP of 17.4.  CBC normal.  Rapid strep screen is negative and culture pending.  Influenza is negative but COVID-19 PCR is positive.  Head CT shows no abnormalities.  Chest x-ray  shows patchy area of opacification in the right lateral lung which could reflect infectious infiltrate.  D-dimer is elevated at 1.8 and CT PE protocol is pending.  She was given Haldol and Zyprexa and will be admitted for observation.            Past Medical History:     Past Medical History:   Diagnosis Date     A-fib (H)      Alzheimer disease (H)      Cancer (H)      Nonsenile cataract              Past Surgical History:     Past Surgical History:   Procedure Laterality Date     APPENDECTOMY       BIOPSY       CHOLECYSTECTOMY       EYE SURGERY       GYN SURGERY       HERNIA REPAIR               Social History:     Social History     Tobacco Use     Smoking status: Never Smoker   Substance Use Topics     Alcohol use: Never             Family History:   The family history was fully reviewed and non-contributory in this case.         Allergies:     Allergies   Allergen Reactions     Nitrofurantoin Unknown             Medications:     Prior to Admission medications    Not on File             Review of Systems:     A Comprehensive greater than 10 system review of systems was carried out.  Pertinent positives and negatives are noted above.  Otherwise negative for contributory information.           Physical Exam:   Blood pressure 121/71, pulse 83, temperature 98.6  F (37  C), temperature source Oral, resp. rate 16, SpO2 98 %.  Wt Readings from Last 1 Encounters:   No data found for Wt     Exam:  GENERAL: No apparent distress. Awake, alert, confused but calm.  HEENT: Normocephalic, atraumatic. Extraocular movements intact.  CARDIOVASCULAR: Regular rate and rhythm without murmurs or rubs. No S3.  PULMONARY: Clear to auscultation bilaterally.  ABDOMINAL: Soft, non-tender, non-distended. Bowel sounds normoactive.   EXTREMITIES: No cyanosis or clubbing. No appreciable edema.  NEUROLOGICAL: CN 2-12 grossly intact, no focal neurological deficits.  DERMATOLOGICAL: No rash, ulcer, bruising, nor jaundice.          Data:    Laboratory:  Recent Labs   Lab 11/02/21 2126   WBC 7.2   HGB 12.4   HCT 37.2           Recent Labs   Lab 11/02/21 2126   *   POTASSIUM 3.9   CHLORIDE 100   CO2 25   ANIONGAP 7   *   BUN 28   CR 0.81   GFRESTIMATED 64   EMILIANO 8.3*     Recent Labs   Lab 11/02/21 2126   DD 1.85*     No results for input(s): CULT in the last 168 hours.    Imaging:  Recent Results (from the past 24 hour(s))   CT Head w/o Contrast    Narrative    EXAM: CT HEAD W/O CONTRAST  LOCATION: [facility]  DATE/TIME: 11/3/2021 12:51 AM    INDICATION: Mental status change, unknown cause, confusion  COMPARISON: None.  TECHNIQUE: Routine CT Head without IV contrast. Multiplanar reformats. Dose reduction techniques were used.    FINDINGS:  INTRACRANIAL CONTENTS: No intracranial hemorrhage, extraaxial collection, or mass effect.  No CT evidence of acute infarct. Moderate volume loss and mild burden presumed chronic small vessel ischemia.    VISUALIZED ORBITS/SINUSES/MASTOIDS: No intraorbital abnormality. No paranasal sinus mucosal disease. No middle ear or mastoid effusion.    BONES/SOFT TISSUES: No acute abnormality.      Impression    IMPRESSION:  1.  No acute intracranial abnormality.    2.  Mild to moderate age-related changes.   XR Chest 2 Views    Narrative    EXAM: XR CHEST 2 VW  LOCATION: [facility]  DATE/TIME: 11/3/2021 12:59 AM    INDICATION: Cough  COMPARISON: None available.      Impression    IMPRESSION: Mildly enlarged cardiac silhouette. Calcified aortic arch. Patchy areas of airspace opacification in the right lateral lung base could reflect an infectious infiltrate. No visible pneumothorax or pleural effusion. Right axillary surgical   clips. Osteopenia.       Hang Rasmussen DO MPH  Formerly [facility] Hospitalist  [address] [place] [address]  11/03/2021

## 2021-11-03 NOTE — ED NOTES
Spoke to Fernando's home owner Hang who is not ready to take patient back, states he is waiting to hear from the patients primary care doctor who needs to speak the hospitalist and recommend the patient needs to stay in the hospital. Hagn's says the daughter Carlene is try to get ahold to PMD

## 2021-11-04 NOTE — ED NOTES
Attempted to call home nurse Joceline who did not answer, unable to leave a message due to the voice mail being full

## 2021-11-04 NOTE — PLAN OF CARE
Pt up to bedside commode x2 A-1.  Pt is hard of hearing, hearing aids are charging in the room.  Pt on RA pt had meet crackers with vanilla pudding. Pt complained of some cramping in left upper thigh, walked a few steps then laid back down in bed.

## 2021-11-04 NOTE — ED NOTES
Spoke with home owner Hang, after speaking with jesse Concepcion about discharging the patient back to the facility.  Hang expressed his concern about taking the patient back to his facility and referred to he was feeling like the governor of [place] treated the elderly and kicked them out of the hospital and caused 1,000 of people to die.  I explained that she was evaluated by the hospitalist this afternoon, she was able to use the commode with the assist of one which is her normal, feed her self, take her daily medications, she was not hypoxic, and her blood pressure was good.  She was ready to be discharged.  He said he had tore up her bathroom floor because the  had told them she would be in the hospital for 2-3 days.  I explained how she had been reevaluated this afternoon and was ready to go home because of above statement. He said he would be at the hospital in 10 minutes.  He did not want to see or meet me and have her ready to go  Home.  Jesse Concepcion was notified.

## 2021-11-04 NOTE — PROGRESS NOTES
Clinic Care Coordination Contact    Background: Care Coordination referral placed from [facility] discharge report for reason of patient meeting criteria for a TCM outreach call by [facility] team.    Assessment: Upon chart review, [facility] Team member will cancel/close the referral for TCM outreach due to reason below:       Patient has discharged to another healthcare facility, skilled nursing facility or group home.     Gita Prabhakar MA  [facility], [facility]

## 2022-10-04 PROBLEM — F03.90 DEMENTIA WITHOUT BEHAVIORAL DISTURBANCE (H): Status: ACTIVE | Noted: 2021-01-01
